# Patient Record
Sex: MALE | URBAN - METROPOLITAN AREA
[De-identification: names, ages, dates, MRNs, and addresses within clinical notes are randomized per-mention and may not be internally consistent; named-entity substitution may affect disease eponyms.]

---

## 2018-01-24 ENCOUNTER — TRANSFERRED RECORDS (OUTPATIENT)
Dept: HEALTH INFORMATION MANAGEMENT | Facility: CLINIC | Age: 43
End: 2018-01-24

## 2019-08-20 ENCOUNTER — OFFICE VISIT (OUTPATIENT)
Dept: FAMILY MEDICINE | Facility: CLINIC | Age: 44
End: 2019-08-20
Payer: COMMERCIAL

## 2019-08-20 VITALS
HEIGHT: 65 IN | RESPIRATION RATE: 16 BRPM | BODY MASS INDEX: 36.32 KG/M2 | SYSTOLIC BLOOD PRESSURE: 134 MMHG | WEIGHT: 218 LBS | DIASTOLIC BLOOD PRESSURE: 82 MMHG | TEMPERATURE: 98 F | HEART RATE: 96 BPM

## 2019-08-20 DIAGNOSIS — F43.23 SITUATIONAL MIXED ANXIETY AND DEPRESSIVE DISORDER: ICD-10-CM

## 2019-08-20 DIAGNOSIS — E66.01 MORBID OBESITY (H): ICD-10-CM

## 2019-08-20 DIAGNOSIS — R79.89 ELEVATED LIVER FUNCTION TESTS: ICD-10-CM

## 2019-08-20 DIAGNOSIS — G47.33 OSA (OBSTRUCTIVE SLEEP APNEA): ICD-10-CM

## 2019-08-20 DIAGNOSIS — Z00.00 ROUTINE GENERAL MEDICAL EXAMINATION AT A HEALTH CARE FACILITY: Primary | ICD-10-CM

## 2019-08-20 DIAGNOSIS — Z13.6 CARDIOVASCULAR SCREENING; LDL GOAL LESS THAN 160: ICD-10-CM

## 2019-08-20 PROCEDURE — 99396 PREV VISIT EST AGE 40-64: CPT | Performed by: FAMILY MEDICINE

## 2019-08-20 PROCEDURE — 36415 COLL VENOUS BLD VENIPUNCTURE: CPT | Performed by: FAMILY MEDICINE

## 2019-08-20 PROCEDURE — 80061 LIPID PANEL: CPT | Performed by: FAMILY MEDICINE

## 2019-08-20 PROCEDURE — 80053 COMPREHEN METABOLIC PANEL: CPT | Performed by: FAMILY MEDICINE

## 2019-08-20 ASSESSMENT — MIFFLIN-ST. JEOR: SCORE: 1796.97

## 2019-08-20 NOTE — PROGRESS NOTES
SUBJECTIVE:   CC: Pallavi Harper is an 43 year old male who presents for preventive health visit.     Healthy Habits:    Do you get at least three servings of calcium containing foods daily (dairy, green leafy vegetables, etc.)? NO    Amount of exercise or daily activities, outside of work: less due to back injury in 2015    Problems taking medications regularly not applicable    Medication side effects: No    Have you had an eye exam in the past two years? yes    Do you see a dentist twice per year? yes    Do you have sleep apnea, excessive snoring or daytime drowsiness?YES - uses cpap, less helpful the past 5 years with weight gain    Patient has history of sleep apnea for which she is using CPAP.  Sometimes he is not able to sleep with CPAP does not work try it.  He also complains of low back pain he has MRIs done in the past and he has mild disc herniation at L4-L5 L5-S1 level.  He has not done any physical therapy but he was advised to see if further evaluation but patient has not had time to do that.  Obesity is also noted in the patient.  He is overall slight anxious and situational anxiety with mild depressed mood.  Is not currently on any medications.    Today's PHQ-2 Score:   PHQ-2 ( 1999 Pf[employer]) 8/20/2019 10/20/2016   Q1: Little interest or pleasure in doing things 0 0   Q2: Feeling down, depressed or hopeless 0 0   PHQ-2 Score 0 0       Abuse: Current or Past(Physical, Sexual or Emotional)- NO  Do you feel safe in your environment? Yes    Social History     Tobacco Use     Smoking status: Former Smoker     Packs/day: 0.25     Types: Cigarettes     Last attempt to quit: 2/3/2009     Years since quitting: 10.5     Smokeless tobacco: Never Used   Substance Use Topics     Alcohol use: No     If you drink alcohol do you typically have >3 drinks per day or >7 drinks per week? No                      Last PSA: No results found for: PSA    Reviewed orders with patient. Reviewed health maintenance and updated  "orders accordingly - Yes  Lab work is in process    Reviewed and updated as needed this visit by clinical staff  Tobacco  Meds  Soc Hx        Reviewed and updated as needed this visit by Provider            ROS:  CONSTITUTIONAL: NEGATIVE for fever, chills, change in weight  INTEGUMENTARY/SKIN: NEGATIVE for worrisome rashes, moles or lesions  EYES: NEGATIVE for vision changes or irritation  ENT: NEGATIVE for ear, mouth and throat problems  RESP: NEGATIVE for significant cough or SOB  CV: NEGATIVE for chest pain, palpitations or peripheral edema  GI: NEGATIVE for nausea, abdominal pain, heartburn, or change in bowel habits   male: negative for dysuria, hematuria, decreased urinary stream, erectile dysfunction, urethral discharge  MUSCULOSKELETAL: NEGATIVE for significant arthralgias or myalgia  NEURO: NEGATIVE for weakness, dizziness or paresthesias  PSYCHIATRIC: NEGATIVE for changes in mood or affect    OBJECTIVE:   BP (!) 150/80   Pulse 96   Temp 98  F (36.7  C) (Tympanic)   Resp 16   Ht 1.645 m (5' 4.76\")   Wt 98.9 kg (218 lb)   BMI 36.54 kg/m    EXAM:  GENERAL: healthy, alert and no distress  NECK: no adenopathy, no asymmetry, masses, or scars and thyroid normal to palpation  RESP: lungs clear to auscultation - no rales, rhonchi or wheezes  CV: regular rate and rhythm, normal S1 S2, no S3 or S4, no murmur, click or rub, no peripheral edema and peripheral pulses strong  ABDOMEN: soft, nontender, no hepatosplenomegaly, no masses and bowel sounds normal  MS: no gross musculoskeletal defects noted, no edema    Diagnostic Test Results:  Labs reviewed in Epic    ASSESSMENT/PLAN:       ICD-10-CM    1. Routine general medical examination at a health care facility Z00.00 Lipid panel reflex to direct LDL Fasting     Comprehensive metabolic panel   2. BOUCHRA (obstructive sleep apnea) G47.33    3. Morbid obesity (H) E66.01    4. CARDIOVASCULAR SCREENING; LDL GOAL LESS THAN 160 Z13.6 Lipid panel reflex to direct LDL " "Fasting     Comprehensive metabolic panel   5. Situational mixed anxiety and depressive disorder F43.23      Labs ordered.  Once done we will follow-up on that.  Discussed with him about sleep apnea he is advised to see a sleep specialist to see if any adjustment needs to be done.  Low back pain discussed further management including physical therapy and some time injections can help.  He will let us know if he needs any referral regarding that.  Healthy diet and regular exercise discussed with the patient.  COUNSELING:  Reviewed preventive health counseling, as reflected in patient instructions       Regular exercise       Healthy diet/nutrition     Estimated body mass index is 36.54 kg/m  as calculated from the following:    Height as of this encounter: 1.645 m (5' 4.76\").    Weight as of this encounter: 98.9 kg (218 lb).         reports that he quit smoking about 10 years ago. His smoking use included cigarettes. He smoked 0.25 packs per day. He has never used smokeless tobacco.      Counseling Resources:  ATP IV Guidelines  Pooled Cohorts Equation Calculator  FRAX Risk Assessment  ICSI Preventive Guidelines  Dietary Guidelines for Americans, 2010  USDA's MyPlate  ASA Prophylaxis  Lung CA Screening    Kevan Kay MD  [facility] G[facility] PRAJEFFERY  "

## 2019-08-20 NOTE — LETTER
August 21, 2019      [address]     [place] [address]        Dear MindyMeredith,    We are writing to inform you of your test results.      Cholesterol level indicates high triglycerides along with very high LDL, this combination can increase the risk of heart disease.  Obesity, lack of exercise and eating habits can contribute to those numbers.  I will suggest you start working on your diet and do regular exercise.  We can either start you on a low-dose of a cholesterol-lowering medication now or if you want to work on your diet and exercise and recheck in 6 months, we can compare and see if we need to start your medication at that time.     Kidney functions are normal.  Glucose is also normal no diabetes.     ALT is slightly elevated this could be due to obesity has some time small amount of fatty tissue on the liver can contribute that as well.  I will suggest rechecking that happen again in 6 weeks.  If this continue to be elevated we may need to do further evaluation with ultrasound.  Labs ordered.  Please call our clinic at [phone] and make that appointment for lab only to check for liver function in 6 weeks.     Resulted Orders   Lipid panel reflex to direct LDL Fasting   Result Value Ref Range    Cholesterol 268 (H) <200 mg/dL      Comment:      Desirable:       <200 mg/dl    Triglycerides 269 (H) <150 mg/dL      Comment:      Borderline high:  150-199 mg/dl  High:             200-499 mg/dl  Very high:       >499 mg/dl  Fasting specimen      HDL Cholesterol 40 >39 mg/dL    LDL Cholesterol Calculated 174 (H) <100 mg/dL      Comment:      Above desirable:  100-129 mg/dl  Borderline High:  130-159 mg/dL  High:             160-189 mg/dL  Very high:       >189 mg/dl      Non HDL Cholesterol 228 (H) <130 mg/dL      Comment:      Above Desirable:  130-159 mg/dl  Borderline high:  160-189 mg/dl  High:             190-219 mg/dl  Very high:       >219 mg/dl     Comprehensive metabolic panel   Result Value  Ref Range    Sodium 140 133 - 144 mmol/L    Potassium 3.8 3.4 - 5.3 mmol/L    Chloride 105 94 - 109 mmol/L    Carbon Dioxide 25 20 - 32 mmol/L    Anion Gap 10 3 - 14 mmol/L    Glucose 94 70 - 99 mg/dL      Comment:      Fasting specimen    Urea Nitrogen 10 7 - 30 mg/dL    Creatinine 0.86 0.66 - 1.25 mg/dL    GFR Estimate >90 >60 mL/min/[1.73_m2]      Comment:      Non  GFR Calc  Starting 12/18/2018, serum creatinine based estimated GFR (eGFR) will be   calculated using the Chronic Kidney Disease Epidemiology Collaboration   (CKD-EPI) equation.      GFR Estimate If Black >90 >60 mL/min/[1.73_m2]      Comment:       GFR Calc  Starting 12/18/2018, serum creatinine based estimated GFR (eGFR) will be   calculated using the Chronic Kidney Disease Epidemiology Collaboration   (CKD-EPI) equation.      Calcium 8.8 8.5 - 10.1 mg/dL    Bilirubin Total 0.6 0.2 - 1.3 mg/dL    Albumin 4.4 3.4 - 5.0 g/dL    Protein Total 8.5 6.8 - 8.8 g/dL    Alkaline Phosphatase 78 40 - 150 U/L     (H) 0 - 70 U/L    AST 39 0 - 45 U/L       If you have any questions or concerns, please call the clinic at the number listed above.       Sincerely,        Kevan Kay MD

## 2019-08-21 LAB
ALBUMIN SERPL-MCNC: 4.4 G/DL (ref 3.4–5)
ALP SERPL-CCNC: 78 U/L (ref 40–150)
ALT SERPL W P-5'-P-CCNC: 112 U/L (ref 0–70)
ANION GAP SERPL CALCULATED.3IONS-SCNC: 10 MMOL/L (ref 3–14)
AST SERPL W P-5'-P-CCNC: 39 U/L (ref 0–45)
BILIRUB SERPL-MCNC: 0.6 MG/DL (ref 0.2–1.3)
BUN SERPL-MCNC: 10 MG/DL (ref 7–30)
CALCIUM SERPL-MCNC: 8.8 MG/DL (ref 8.5–10.1)
CHLORIDE SERPL-SCNC: 105 MMOL/L (ref 94–109)
CHOLEST SERPL-MCNC: 268 MG/DL
CO2 SERPL-SCNC: 25 MMOL/L (ref 20–32)
CREAT SERPL-MCNC: 0.86 MG/DL (ref 0.66–1.25)
GFR SERPL CREATININE-BSD FRML MDRD: >90 ML/MIN/{1.73_M2}
GLUCOSE SERPL-MCNC: 94 MG/DL (ref 70–99)
HDLC SERPL-MCNC: 40 MG/DL
LDLC SERPL CALC-MCNC: 174 MG/DL
NONHDLC SERPL-MCNC: 228 MG/DL
POTASSIUM SERPL-SCNC: 3.8 MMOL/L (ref 3.4–5.3)
PROT SERPL-MCNC: 8.5 G/DL (ref 6.8–8.8)
SODIUM SERPL-SCNC: 140 MMOL/L (ref 133–144)
TRIGL SERPL-MCNC: 269 MG/DL

## 2019-11-08 ENCOUNTER — HEALTH MAINTENANCE LETTER (OUTPATIENT)
Age: 44
End: 2019-11-08

## 2020-05-04 ENCOUNTER — TRANSFERRED RECORDS (OUTPATIENT)
Dept: HEALTH INFORMATION MANAGEMENT | Facility: CLINIC | Age: 45
End: 2020-05-04

## 2020-05-24 ENCOUNTER — TELEPHONE (OUTPATIENT)
Dept: SLEEP MEDICINE | Facility: CLINIC | Age: 45
End: 2020-05-24

## 2020-05-24 NOTE — TELEPHONE ENCOUNTER
A request to schedule an appointment has been received for Dr. James Arias.   The patient has been notified that a member of the [facility] will contact the patient to schedule the appointment.

## 2020-06-26 ENCOUNTER — VIRTUAL VISIT (OUTPATIENT)
Dept: SLEEP MEDICINE | Facility: CLINIC | Age: 45
End: 2020-06-26
Payer: COMMERCIAL

## 2020-06-26 VITALS — WEIGHT: 203 LBS | HEIGHT: 66 IN | BODY MASS INDEX: 32.62 KG/M2

## 2020-06-26 DIAGNOSIS — G47.33 OSA (OBSTRUCTIVE SLEEP APNEA): ICD-10-CM

## 2020-06-26 DIAGNOSIS — F51.04 CHRONIC INSOMNIA: Primary | ICD-10-CM

## 2020-06-26 PROCEDURE — 90791 PSYCH DIAGNOSTIC EVALUATION: CPT | Mod: GT | Performed by: PSYCHOLOGIST

## 2020-06-26 ASSESSMENT — ANXIETY QUESTIONNAIRES
6. BECOMING EASILY ANNOYED OR IRRITABLE: NEARLY EVERY DAY
7. FEELING AFRAID AS IF SOMETHING AWFUL MIGHT HAPPEN: NEARLY EVERY DAY
IF YOU CHECKED OFF ANY PROBLEMS ON THIS QUESTIONNAIRE, HOW DIFFICULT HAVE THESE PROBLEMS MADE IT FOR YOU TO DO YOUR WORK, TAKE CARE OF THINGS AT HOME, OR GET ALONG WITH OTHER PEOPLE: VERY DIFFICULT
1. FEELING NERVOUS, ANXIOUS, OR ON EDGE: NEARLY EVERY DAY
5. BEING SO RESTLESS THAT IT IS HARD TO SIT STILL: SEVERAL DAYS
GAD7 TOTAL SCORE: 17
2. NOT BEING ABLE TO STOP OR CONTROL WORRYING: NEARLY EVERY DAY
3. WORRYING TOO MUCH ABOUT DIFFERENT THINGS: NEARLY EVERY DAY

## 2020-06-26 ASSESSMENT — PATIENT HEALTH QUESTIONNAIRE - PHQ9
SUM OF ALL RESPONSES TO PHQ QUESTIONS 1-9: 20
5. POOR APPETITE OR OVEREATING: SEVERAL DAYS

## 2020-06-26 ASSESSMENT — MIFFLIN-ST. JEOR: SCORE: 1743.55

## 2020-06-26 NOTE — PROGRESS NOTES
"Pallavi Harper is a 44 year old adult who is being evaluated via a billable video visit.      The patient has been notified of following:     \"This video visit will be conducted via a call between you and your physician/provider. We have found that certain health care needs can be provided without the need for an in-person physical exam.  This service lets us provide the care you need with a video conversation.  If a prescription is necessary we can send it directly to your pharmacy.  If lab work is needed we can place an order for that and you can then stop by our lab to have the test done at a later time.    Video visits are billed at different rates depending on your insurance coverage.  Please reach out to your insurance provider with any questions.    If during the course of the call the physician/provider feels a video visit is not appropriate, you will not be charged for this service.\"    Patient has given verbal consent for Video visit? Yes    Will anyone else be joining your video visit? No      Video-Visit Details    Type of service:  Video Visit    Video Start Time: 8:39 AM  Video End Time: 9:15 AM, approximate    Originating Location (pt. Location): Home    Distant Location (provider location):  [facility]     Platform used for Video Visit: Rupal Arias PsyD    SLEEP MEDICINE CONSULTATION  Sleep Psychology    Name: Pallavi Harper MRN# [id number]   Age: 44 year old YOB: 1975     Date of Consultation: Jun 26, 2020  Consultation is requested by: No referring provider defined for this encounter.  Primary care provider: Chris Way    Reason for Sleep Consultation     Pallavi Harper is a 44 year old adult seen today for a behavioral sleep medicine consultation because of insomnia.      Assessment and Plan     Sleep Diagnoses/Recommendations:    1. Chronic insomnia  Insomnia presentation consistent with psychophysiologic insomnia emerging " about 2 years ago with precipitating events stress related.  Patient is an excellent candidate for cognitive behavioral therapy for insomnia with targets and inadequate stimulus control, sleep specific worry, active mind, and inadequate sleep hygiene.  Patient was prescribed a 6.5-hour sleep window based on sleep diary data.  Sleep schedule between 9:30 PM-4 AM.  Patient will avoid napping as he currently does.  He will reduce caffeine use to no later than noon.  He will get out of bed if he cannot sleep and do some other relaxing activities he does before bed to lying down.  He will return to bed when he feels sleepy.  He will avoid researching and focusing on insomnia on the Internet.  Follow-up in 2 weeks    2. BOUCHRA (obstructive sleep apnea)  Continue using CPAP nightly, follow-up with [facility] for care as needed      See patient instructions for initial treatment recommendations and behavioral sleep plan.    Summary Counseling:      Pallavi was provided information about the pathophysiology of insomnia and psychophysiological factors contributing to the onset and maintenance of insomnia associated with psychophysiologic features, co-occurring obstructive sleep apnea.  Treatment option were discussed including component of cognitive-behavioral therapy for insomnia. The benefits and potential early side effects of treatment including increased daytime sleepiness were discussed. She was advised to seek medical advise and consultation around use of or changes to prescription sleep medication, Patient was counseled on the importance of avoiding driving if drowsy.        Follow-up: 2 weeks     History of Present Sleep Complaint     Pallavi Harper is a 44 year old year old adult who reports he has snored since childhood.  He is a loud snorer.  About 5 years ago wife witness sleep apnea and a sleep test was completed about 5 years agowith the [facility] and was initiated on CPAP.  Felt he  was helped a great deal by use of CPAP.    Reports significant weight gain over last few years.  Lately he notices he awakens with headache, and increased tiredness.  He went back to [facility] and had adjustment made to CPAP setting.    Over last couple years there has been onset of increase sleep fragmentation and insomnia.  More difficulty falling back to sleep, increase worry and rumination in the middle of the night.    Download data per patient report is effective.    Reports very active mind, worry about work and next day activities.  In 2016 switched job in company with new boss and team.  Increased performance expectations.    Began experiencing early morning waking and has consistent waking at 4 AM. Increased worry about health, developed and arrhythmia.      Patient is seeing a therapist to deal with stress.  Also working with cardiologist to address a possible cardia issues.    Work in real estate, accounting and finance.  Describes job as very demanding. Est works 8-9 hours a day or 50 hours a week.    , has bed partner. Just sold their house. Now in close quarters in small apartment.    Caffeine use - 1-2 cups of coffee.  Drink tea during the day.  Latest after dinner.    Alcohol use - no    No cannabis, has tried CBD oil    No nicotine    Bedroom is dark, comfortable and quiet.    Took melatonin for a while but no longer as it didn't work.  Used diphenhydramine was not effective.    Bedtime is between 9-10 PM.  Sleep latency within 15 minutes.  Applies CPAP nightly. Up for the day by 4-430 AM.Wake us 3-6 times per night.  Takes about 5-30 minutes to fall back to sleep.  No naps.    In the middle of the night frustrated not going back to sleep.  Concerned about effects of poor sleep on work    No history of shiftwork    Denies parasomnias, nightmares, sudden sleep attacks, sleep paralysis, denies restless legs symptoms.    Previous Sleep Studies:    Sleep study completed [facility] in  "2015, no data available.  Patient reports continuous use of CPAP with good results.         Screening          [place] Sleepiness Scale  Total score - [place]: 7 .            PHQ-9 SCORE 6/26/2020   PHQ-9 Total Score 20       MELANIA-7 SCORE 6/26/2020   Total Score 17       Patient Activation Score     MAURI Score (Last Two) 11/19/2012   MAURI Raw Score 38   Activation Score 52.9   MAURI Level 2         Vitals     Ht 1.676 m (5' 6\")   Wt 92.1 kg (203 lb)   BMI 32.77 kg/m       Medical History     Patient Active Problem List   Diagnosis     Obesity     Dyspnea and respiratory abnormality     CARDIOVASCULAR SCREENING; LDL GOAL LESS THAN 160     Hyperlipidemia LDL goal <160     BOUCHRA (obstructive sleep apnea)     Obesity (BMI 35.0-39.9) with comorbidity (H)        Current Outpatient Medications   Medication Sig Dispense Refill     Multiple Vitamin (MULTI-VITAMIN) per tablet Take 1 tablet by mouth daily. 90 tablet 3       Past Surgical History:   Procedure Laterality Date     NO HISTORY OF SURGERY            Allergies   Allergen Reactions     Crabs [Crustaceans]          Psychiatric History     Prior Psychiatric Diagnoses: none   Psychiatric Hospitalizations: none   Use of Psychotropics none      Chemical Use     Prior Chemical Dependency Treatment: none         Family History     Family History   Problem Relation Age of Onset     Hypertension Mother      Cerebrovascular Disease Mother         in her early 50's     Lipids Mother      Gastrointestinal Disease Father         Acid Reflux     Heart Disease Maternal Grandmother         hear attack     Asthma Maternal Grandfather      Arthritis Paternal Grandmother      Osteoporosis Paternal Grandmother      Cancer Paternal Grandfather         Lung cancer - smoker     Asthma Sister      Genitourinary Problems Sister         Kidney problem - because of too many drugs        Sleep Disorders: None reported    Social History     Social History     Socioeconomic History     Marital status: "      Spouse name: None     Number of children: None     Years of education: None     Highest education level: None   Occupational History     None   Social Needs     Financial resource strain: None     Food insecurity     Worry: None     Inability: None     Transportation needs     Medical: None     Non-medical: None   Tobacco Use     Smoking status: Former Smoker     Packs/day: 0.25     Types: Cigarettes     Last attempt to quit: 2/3/2009     Years since quittin.4     Smokeless tobacco: Never Used   Substance and Sexual Activity     Alcohol use: No     Drug use: No     Sexual activity: Yes     Partners: Female     Birth control/protection: Condom   Lifestyle     Physical activity     Days per week: None     Minutes per session: None     Stress: None   Relationships     Social connections     Talks on phone: None     Gets together: None     Attends Alevism service: None     Active member of club or organization: None     Attends meetings of clubs or organizations: None     Relationship status: None     Intimate partner violence     Fear of current or ex partner: None     Emotionally abused: None     Physically abused: None     Forced sexual activity: None   Other Topics Concern     Parent/sibling w/ CABG, MI or angioplasty before 65F 55M? Not Asked   Social History Narrative     None       Works full-time in real estate, finance, leads a team, moderate work stress     Mental Status Examination     Achmad presented as appropriately dressed and groomed and was oriented X3 with speech language intact.  The patient was cooperative throughout the evaluation with no signs of hallucinations, delusions, loosening of associations or other thought disturbance.  Mood was normal.  Affect was congruent with mood. Insight and judgement we intact.  Memory was intact for recent and remote elements.  There was no report of suicidal ideation, intention or plan. Attention and concentration were within  normal.        Copy:   Chris Way               No referring provider defined for this encounter.    James Arias PsyD, LP, DBSM  Diplomate, Behavioral Sleep Medicine  [facility]  [facility] [place]

## 2020-06-26 NOTE — PATIENT INSTRUCTIONS
Pallavi,  It was great having the opportunity to consult with you about your sleep concerns this morning.  Below you will find an introduction to how insomnia develops as well as a second module discussing your plan of action.  In addition to the steps outlined below, I think you may benefit from reducing caffeine use to no later than noon.  Please read review and follow the instructions in your intervention module called sleep strengthening.  Your follow-up is scheduled for July 10 at 8:30 AM.  There are instructions about using the CBT-I  application to continue to monitor your sleep during the next 2 weeks.  We will review these data at her follow-up video visit.    James Arias PsyD        Welcome to Cognitive Behavioral Therapy for Insomnia (CBT-I)    Most people have trouble sleeping at some point in their life.   Chronic insomnia means you have had trouble falling asleep and/or staying asleep for at least the past three months.  Despite allowing enough time for sleep, it is affecting how you feel. You are not alone.  It is estimated that 10-15% of adults experience chronic insomnia.     How CBT-I Works    Cognitive Behavioral Therapy for Insomnia (CBT-I) targets behavioral factors that lead to long-term insomnia:    ? Conditioning    When you lay awake in bed over many nights, your body actually becomes trained or 'conditioned' to be awake during the night.  It makes the bed associated with alertness instead of sleepiness.    ? Habits that weaken your body's sleep drive and sleep clock    Changing sleep schedules, extended time in bed, using mobile devices and computers close to bedtime, and naps too late in the day can harm your sleep at night.    ? Emotional and Physical States    Things such as worrying about sleep, stress, drinking too much caffeine, and not winding down before bed can interfere with your body's natural sleep drive.    Understanding Sleep and Insomnia    [facility] CBT-I  is a four-part program that teaches you the skills needed for a better night's sleep. The first step in your program is learning a bit about sleep and insomnia.  This introduction discusses some of the science behind the habits you will change during your program. You will also begin taking the first steps towards changing how you sleep.    The Basics of Sleep    How does sleep help us?    Sleep like food and water is something we need every day. The purpose of sleep is still not exactly clear, but sleep experts agree we need consistent quality sleep to function at our best. There is evidence that sleep helps maintain brain and body functions.  IT helps maintain thinking ability and mood.  Sleep is actually a very active part of life. Sleep occurs in four stages that cycle every  minutes throughout the night. We get our deepest sleep during the first few hours of sleep.  During the last half of our sleep, we usually get the bulk of our REM (Rapid Eye Movement) sleep.  REM sleep is when most of our dreaming occurs.    How much sleep do we need?    Sleep needs vary from person to person. Most adults need between 6-8 hours of sleep.  Sleeping too little or too much may be a health risk.  As we age, most people report their sleep gets lighter, earlier, shorter, and more restless.     What Controls Our Sleep?    The three things that regulate your sleep are your sleep drive, biological clock and emotional/physical states.  Together these make us feel alert during the day and promote sleep at night.    Your sleep drive depends on how long you have been awake. It is lowest when you first wake up.  Sleep drive gradually increases as the day goes on.  The longer time you are awake the easier it is to fall asleep.  Sleeping gradually reduces your sleep drive. That is why napping in the evening or close to bed can make it harder to sleep at night.    Your biological clock promotes wakefulness during the day and sleep at  night.        Figure 1 two process sleep model (source: 1. Bertha ROWE, Prince R, Roger T, et al. Future research directions in sleep and ADHD: report of a consensus working group.   J Atten Disord. 2013;17(7):550-564. doi:10.1177/0693940794636452)    Emotional and Physical States    Mental activity, emotions and physical symptoms can make your brain too active to sleep by masking the strength of your sleep drive. Your brain's arousal system not only triggers insomnia, it plays a role in maintaining it as well.  Common sources of arousal include:    ? Worry about sleep in bed  ? An active mind concerned about unfinished tasks  ? Anxiety, Stress and Depression  ? Pain     Understanding Insomnia    What causes insomnia?  Some people have a greater likelihood of developing insomnia due to genetics, personality or age. A host of things can trigger it: jet lag, working a different shift, medication, or the onset of a medical or mental health condition.    What maintains insomnia?    Short-term insomnia can become chronic when you begin to feel fearful, worried and  on guard  about sleep loss. As you spend more time in bed or try forcing yourself to sleep your bed becomes linked with wakefulness.  This is why people with insomnia commonly report feeling tired before bed but suddenly more alert when getting into bed.  This type of  conditioning  maintains the insomnia even when the triggering event or condition is resolved.    Tracking your Sleep    Sleep tracking is an essential part of training yourself to sleep better and monitoring your progress.  There are several ways to keep track of your sleep habits.     [employer] Sleep Diary    The [facility] sleep diary is easy to use and includes an example of how to complete it every morning after you get up for the day. Do not watch the clock at night.  Your daily diary entry is your recollection of the past 24 hours.    Sleep Diary Apps    CBT-i  and other free sleep  diary apps are a convenient way to track your sleep on your mobile phone. If using CBT-i , simply go to My Sleep >Sleep Diary > Add New Entry and record your entry for the day. The [employer] graphs your information and has helpful reminders.      Mobile and Wearable Sleep Tracking Devices    There are many sleep tracking devices and mobile applications that estimate the timing and quantity of sleep by measuring body movement.  Though many of these devices claim to measure the depth or stages of sleep, they cannot measure brain wave activity or other measures required to determine the actual stages of sleep.  They are helpful in estimating the timing and total amount of time you sleep.    CBT-I and Sleeping Pills    Sleep medications can be helpful in the short-term but often stop working in the long-term.  Sleeping pills treat symptoms and not the underlying cause of insomnia.  They also can have side effects that last well into the day. Abruptly stopping sleeping pills can cause temporary rebound insomnia and lead to increased distress about sleeplessness.  This in turn strengthens the belief that pills are necessary for sleep.    Many patients choose to discontinue sleeping pills prior to beginning CBT-I.  You should talk to your prescribing provider before tapering or discontinuing sleep medication.    Changes You Can Make TODAY    As you begin to track your sleep, we recommend you get started today with the following changes that set the stage for a better night's sleep.    Reduce your consumption of caffeine and alcohol.  Both can disrupt sleep and make strengthening your sleep more difficult.      ? No caffeine within 6 hours of bedtime   ? No more than 3 caffeinated tin per day (regular 8 oz. cups)  ? No alcohol within 3 hours of bedtime    Make sure your bedroom is quiet, comfortable and dark.  Noise, light and an uncomfortable sleep space can harm your sleep.      ? Light in the bedroom can affect the depth  and even the timing of your sleep.   ?  Remove anything from your sleep space unrelated to sleep such as a computer or TV.    ? Make sure the temperature in your sleep space is comfortable - neither too warm nor cold.        Cognitive Behavioral Therapy for Insomnia (CBT-I)    Sleep Strengthening    Now that you understand a bit more about how sleep works and what causes insomnia, you are ready to move on to the core of CBT-I:  Sleep Strengthening.  This process involves five key strategies that will:    ? Strengthen your sleep drive and biological sleep clock  ? Strengthen the link between your bed and sleep    Core Sleep Strengthening Strategies     You are now ready to start the process of strengthening your sleep. Much like physical therapy strengthens muscles, studies show these five sleep strategies are the key to achieving stronger, healthier sleep.     1. Reduce your  Time In Bed    Spending extra time in bed trying to get more sleep actually can cause more sleep disruption and weaken sleep efficiency. Sleep efficiency is simply the percentage of time a person spends asleep while in bed. Normal sleep efficiency is thought to be 85% or greater.  By reducing your time in bed, you will be awake longer leading to quicker and deeper sleep. This strategy does not reduce the amount of sleep you get, just the time you are awake in bed:                                       Your Sleep Schedule Prescription    During the first step of sleep strengthening, you will reduce your time in bed following a Sleep Schedule Prescription. Your prescription is determined by the average nightly amount of sleep you got over the past two weeks plus 30 minutes. It also takes into account the best time for you to sleep. The least amount of time prescribed is 6 hours in bed unless a sleep specialist recommends otherwise.     Total Time in Bed:  6.5 hours  Bedtime:  No earlier than  9:30 PM  Wake-up Time:  Out of bed every day by  4 am  with alarm.     2. Don't go to bed until you feel sleepy (not tired or fatigued)     This helps increase your sleep drive by keeping you awake longer.  If you go to bed when you're not sleepy, it gives your brain the wrong message and can lead to frustration.     If you feel sleepy before your prescribed bedtime, find activities that can help you stay awake until it is time for you to go to bed. Even brief naps in the evening can be very disruptive of sleep at night.     3. Don't stay in bed unless you are sleeping      If you are unable to fall asleep or return to sleep after about 30 minutes, get out of bed. This helps train your brain that the bed is for sleep. It is harmful to your sleep when you are worried or frustrated in bed. Do not read, eat, worry, think about sleep, use mobile phones or tablets, or watch TV in bed. Do not watch the clock during the night.     Go to another room and do something relaxing. Plan things you can do when you get out of bed. Avoid use of mobile devices or computers when out of bed.    Return to bed only when you feel sleepy again.  Repeat as often as needed throughout the night.     4. Get out of bed at the same time every day    Getting up at the same time helps set your biological clock. It is important to stick to your wake time no matter how much sleep you got the night before or how you feel in the morning.    Varying your wake can have the same effect as jet lag.  It disrupts your biological clock and makes you feel more tired and exhausted.  Trying to  catch up  on sleep on the weekends only makes things worse and sets you up for a cycle of poor sleep the following weekdays.      Make sure you set an alarm and keep it away from the bed to prevent looking at the clock during the night.      5. Avoid daytime napping    Avoid daytime napping if possible. Napping partially fulfills your need for sleep and weakens your sleep drive at night.    However, if you find yourself  sleepy (not just tired) you can take a brief 15-30 minute nap during the day if needed.  Naps within 7-8 hours of your prescribed wake time are less likely to affect your sleep the coming night.  Sleepy people make more mistakes and may hurt themselves or others. Therefore, safety trumps all other sleep prescriptions.  Never drive or operate equipment if drowsy or sleepy.     Changing Your Sleep Schedule Prescription    After one week, you can adjust your sleep schedule prescription based on how well you are sleeping. Use the following guidelines to change your prescribed time in bed based on information from your Sleep Diary, Mobile Sleep Diary Corina or Wearable Device:          Increase Time in Bed by 15 Minutes IF:      You are falling asleep in less than 30 minutes AND awake less than 30 minutes during the night.       OR your CBT-I  pp, Mobile Corina or Wearable Device indicates your sleep efficiency has been greater than 90%.              Decrease Time in Bed by 15 Minutes IF:      It is taking longer than 30 minutes to fall asleep OR you are awake more than 30 minutes during the night.        OR your CBT-I  Corina, Mobile Corina or Wearable Device indicates your sleep efficiency has been less than 80%.      DO NOT decrease your sleep schedule below 6 hours unless instructed by your provider.    Change is Challenging     Research shows that making significant changes in sleep habits improves sleep quality and how you feel. Improvement takes time and is not always steady. Change is challenging and can be stressful.  This is especially true if old habits - like spending more time in bed -- were a way to avoid worry about getting enough sleep.

## 2020-06-27 ASSESSMENT — ANXIETY QUESTIONNAIRES: GAD7 TOTAL SCORE: 17

## 2020-07-09 VITALS — WEIGHT: 201 LBS | HEIGHT: 66 IN | BODY MASS INDEX: 32.3 KG/M2

## 2020-07-09 ASSESSMENT — ANXIETY QUESTIONNAIRES
3. WORRYING TOO MUCH ABOUT DIFFERENT THINGS: NEARLY EVERY DAY
GAD7 TOTAL SCORE: 8
5. BEING SO RESTLESS THAT IT IS HARD TO SIT STILL: SEVERAL DAYS
IF YOU CHECKED OFF ANY PROBLEMS ON THIS QUESTIONNAIRE, HOW DIFFICULT HAVE THESE PROBLEMS MADE IT FOR YOU TO DO YOUR WORK, TAKE CARE OF THINGS AT HOME, OR GET ALONG WITH OTHER PEOPLE: SOMEWHAT DIFFICULT
6. BECOMING EASILY ANNOYED OR IRRITABLE: SEVERAL DAYS
1. FEELING NERVOUS, ANXIOUS, OR ON EDGE: SEVERAL DAYS
2. NOT BEING ABLE TO STOP OR CONTROL WORRYING: SEVERAL DAYS
7. FEELING AFRAID AS IF SOMETHING AWFUL MIGHT HAPPEN: NOT AT ALL

## 2020-07-09 ASSESSMENT — MIFFLIN-ST. JEOR: SCORE: 1734.48

## 2020-07-09 ASSESSMENT — PATIENT HEALTH QUESTIONNAIRE - PHQ9
SUM OF ALL RESPONSES TO PHQ QUESTIONS 1-9: 8
5. POOR APPETITE OR OVEREATING: SEVERAL DAYS

## 2020-07-09 NOTE — PROGRESS NOTES
"Pallavi Harper is a 44 year old adult who is being evaluated via a billable video visit.      The patient has been notified of following:     \"This video visit will be conducted via a call between you and your physician/provider. We have found that certain health care needs can be provided without the need for an in-person physical exam.  This service lets us provide the care you need with a video conversation.  If a prescription is necessary we can send it directly to your pharmacy.  If lab work is needed we can place an order for that and you can then stop by our lab to have the test done at a later time.    Video visits are billed at different rates depending on your insurance coverage.  Please reach out to your insurance provider with any questions.    If during the course of the call the physician/provider feels a video visit is not appropriate, you will not be charged for this service.\"    Patient has given verbal consent for Video visit? Yes  How would you like to obtain your AVS? Vincenthart  Patient would like the video invitation sent by: Send to e-mail at: [email or web address]  Will anyone else be joining your video visit? No      Video-Visit Details    Type of service:  Video Visit    Video Start Time: 8:29 AM  Video End Time: 9:02 AM    Originating Location (pt. Location): Home    Distant Location (provider location):  [facility]     Platform used for Video Visit: Rupal Arias Rodolfo      SLEEP MEDICINE VIRTUAL VIDEO VISIT  Sleep Psychology    Patient Name: Pallavi Harper  MRN:  [id number]  Date of Service: Jul 10, 2020       Subjective Report     Pallavi Harper  returns for a telehealth video visit to discuss progress in implementing behavioral strategies for the management of insomnia.  Patient consent for initiation of video visit was obtained and documented prior to initiation of visit.     Pallavi reports he is sleeping overall.  Focus of session on addressing " "negative sleep thoughts and maladaptive expectations about sleep.  .     .     Sleep Data:     Source of Data: CBT-I     Sleep Latency: Within 15 min.  Times Awakened: 1  Wake Time After Sleep Onset: 35 min.  Total Sleep Time: 6 hours 15 min.  Sleep Efficiency: Greater than 85 %    Total score - [place]: 7 .    CELESTINO Total Score: 14       Interventions     Strategies and recommendations including stimulus control, cognitive restructuring, stress management were discussed today.       Vital Signs     Ht 1.676 m (5' 6\")   Wt 91.2 kg (201 lb)   BMI 32.44 kg/m       Mental Status     Speech/Language:  Normal  Thought Process: Intact  Associations:  Normal  Thought Content: Normal    Diagnostic Impressions and Plan       1. Chronic insomnia  Patient is reporting a significant provement in sleep efficiency, sleep consolidation and reduction wake after sleep onset.  Patient will focus in the next 3 weeks on addressing and reducing negative sleep thoughts, and addressing sleep specific worry and anxiety around health.  He will follow-up with his psychotherapist to address this.  We also discussed health behavior changes including increased activity and returning to the dietary changes that promote better health given his concerns about increasing weight.  He will follow-up with his physician about his concern about possibly being \"prediabetic\".    2. BOUCHRA (obstructive sleep apnea)  Continue to use CPAP as prescribed        Follow-up: 2 weeks      James Arias PsyD, VIRY, DBSM  Diplomate, Behavioral Sleep Medicine  [facility] [place] [facility] [place]                      "

## 2020-07-09 NOTE — PATIENT INSTRUCTIONS
Your followup appointment is scheduled for July 21 at 1 PM.  Continue with your current sleep schedule and recording in the morning using CBTI .    Below are modules for you to review addressing worry about sleep, relaxation and worry management before bed, and stress during COVID 19.  Take care !    James Arias PsyD, [facility],  Director, Insomnia ProgramN[facility]          Cognitive Behavioral Therapy for Insomnia (CBT-I)    Developing Healthy Sleep Thoughts    Insomnia is often is triggered by stressful events such as a change in employment, a separation, medical illness, or loss.  How you handle your sleep problem, mainly your thoughts and behaviors, determines in large part whether your sleeplessness is short -term or develops into chronic insomnia well after the stressful event is over.     This step of your program involves learning a set of skills to change negative and worrisome thoughts about sleep.   Insomnia is more likely to persist if you interpret the onset as a threat or loss of control.  Worry, fears and untrue beliefs about insomnia can become a vicious cycle.  Negative sleep thoughts activate the physical and emotional systems of your body.  This strengthens wakefulness and weakens your sleep system.  This in turn produces increased stress and pressure to sleep.  We call this unhelpful sleep effort.     Changing How You Think About Insomnia    We now know that our thoughts and attitudes affect our stress response.  Negative sleep thoughts can worsen your insomnia. They can lead to greater fear or anxiety about sleep, which in turn can aggravate your sleep problem. Thinking more positively and realistically about your sleep promotes its health and healing.     Myths about Sleep    ? People need 8 hours of sleep     This is untrue.  Sleep needs vary from person to person.  Most people need between 6-8 hours to feel alert during the day.  People who sleep 7 hours live longer than  those who sleep 8 hours.  Research also suggests people who sleep 5 hours live longer than those who sleep 9 hours    ? Insomnia is the same as sleep deprivation    Sleep deprivation is lack of sleep due to not allowing enough time for sleep.  This is typically not true for insomnia where people have enough time for sleep and even extend their sleep time trying to get more sleep.  Most people with insomnia get about the same amount of sleep as normal sleepers.  The difference is that with insomnia the sleep is fragmented and less consolidated.    You are Getting More Sleep than You Think    Research using objective sleep tests reveal that people with insomnia get an average of one hour more sleep than they think. This is due in part because the brain misperceives Stage 1 and 2 sleep as being awake.  In addition, stress and arousal while awake in bed changes the brain's perception of time awake.    Examples of Unhealthy Sleep Thoughts    Negative sleep thoughts can have a profound impact on your ability to get a good night's sleep. Below are some examples of negative thoughts associated with insomnia that may sound familiar to you:    ? I must get 8 hours of sleep to function during the day.  ? I won't get to sleep tonight.  ? Insomnia is going to cause health problems.  ? I am dreading going to bed.  ? I woke up early again.  I know I won't get back to sleep.  ? The reason I feel terrible today is because of my insomnia.  ? I've totally lost control of my sleep.  ? I can't sleep without a sleeping pill.    Negative thoughts usually occur automatically and feel like a knee-jerk reaction.  They are often untrue or distorted, especially late in the evening as you become increasingly tired and others are asleep.      Changing Unhealthy Sleep Thoughts    Now that you understand the impact that negative thoughts can have on your sleep, you are ready to change these unhelpful thoughts.  The strategy is powerful and simple:   By recognizing and replacing your negative thoughts about sleep with more accurate, positive thoughts, you will be less anxious and frustrated about your sleep. A more realistic and positive attitude about sleep will allow you to relax and sleep more easily through the night.           There are several important steps involved in changing your unhelpful and negative thoughts about sleep:            Examples of Healthy Sleep Thoughts    ? My work will not suffer much if I have a poor night's sleep.    ? I'm probably getting more sleep than I think.    ? Other things than my sleep affect my daytime functioning.    ? Because I didn't sleep well last night, I am more likely to sleep well tonight because of increased sleep drive that leads to deeper sleep.    ? Sleep requirements vary from one person to another.    ? If I don't sleep well, most of the time the worst that can happen is that my mood might not be as bright the next day.    ? If I awaken after about 5 hours of sleep, I have gotten the core sleep I need for the day.    ? I'm more likely to fall asleep the longer I've been awake    ? I'm more likely to fall asleep as my body temperature begins to decrease through the night.    ? My body's wakefulness system takes charge during the day to promote daytime functioning.    ? These sleep skills have worked for others, and they can work for me.    Other Recommendations for Changing Beliefs and Attitudes   About Your Sleep    ? Keep Realistic Expectations     There is a widespread belief that 8 hours of sleep is necessary to feel refreshed and function well during the day. Many believe that good sleep means never waking up at night.  Others come to expect that they should always wake up in the morning feeling full of energy.  Concerns may arise when your actual sleep falls short of these expectations. Try to avoid placing undue pressure on yourself to achieve certain sleep levels.  It only increases anxiety about  sleeping.  Focus on quality sleep not quantity of sleep.    ? Revise Your Thoughts about the Causes of Insomnia      There is a natural tendency to attribute our sleep problems completely to external factors such as a chemical imbalance, pain, aging or things over which we may have little control.  Although these factors may contribute to your insomnia, research shows CBT-I is beneficial even if they are present.    ? Don't Blame Insomnia for All Daytime Impairments      Many individuals blame insomnia for every symptom or concern they experience during the day from fatigue to lack of concentration. Though poor sleep may produce some of these symptoms, it us usually untrue that all daytime impairment is attributable to insomnia.  It is more often that other factors such as stress and co-occurring medical problems contribute more to how you feel during the day.      ? Don't Catastrophize      Catastrophic thinking means making a sleep mountain out of a molehill.  Some people believe poor sleep will have catastrophic consequences to their physical health, mental health and appearance. Others see insomnia as a complete loss of control.  These perceived consequences of insomnia often prompt people to seek medication or other treatment.  Keep in mind insomnia can be very unpleasant but for the most part is not dangerous.    ? Don't Focus on Sleep     Some people reduce their activity level because of poor sleep.  Although sleep is a necessary part of life, don't make it the focus of your thoughts and concern. Trust that if you engage in health sleep habits, your body will give you the sleep it needs.  Make sure you continue your normal activities despite your insomnia.    ? Never Try to Sleep      Of all the habits the most important one is this:  Never try to force yourself to sleep.  Doing so usually backfires and makes things worse. Instead, focus on keeping to your prescribed sleep schedule, getting up at the same  time every day and using the bed only for sleep.            Cognitive Behavioral Therapy for Insomnia (CBT-I)    Relaxation and Sleep    Winding Down Time    A wind-down time before you go to bed can help you relax your mind and body. Ways to help transition from a busy day to bedtime include things like:    ? Listening to calm music  ? Reading  ? Watching a pleasant or relaxing TV show  ? Taking a bath    We recommend you set a reminder to begin your wind down time one hour before bedtime.  Many mobile sleep apps such as CBT-I  have wind-down time reminders.    Relaxation and Sleep    Research shows relaxation training can reduce the time it takes to fall asleep and improve sleep quality.  Relaxation training works by reducing physical, emotional and mental arousal that can interfere with your sleep.     Relaxation skills are easy to learn on your own and are widely available free through mobile apps or online.  We recommend doing some form of relaxation exercise daily for at least 10 minutes.  This can include short breathing exercises used throughout the day to help manage stress.    If you are using the CBT-I  mobile lyubov you will find the following relaxation exercises in the Tools section including Breathing Tools, Progressive Muscle Relaxation and Mindfulness Exercise.    Insight Timer and Calm are two examples of many well-reviewed mobile apps that offer free and for-purchase guided relaxation exercises and meditations.     You Tube is a great resource learning a range of techniques from progressive muscle relaxation to guided imagery.  One example is Five of the Best Sleep Guided Meditations, a set of guided sleep meditations at https://www.[employer].com/watch?v=wsB_TnQfCxA    Do not use guided relaxation tapes while driving or operating equipment.      Managing Worry before Bed    All human beings worry.  Uncontrolled worry can affect your sleep and health by activating your arousal system.  Worry  makes your brain, body and heart behave like there is a danger or threat.  This stress response can make it more difficult to fall asleep and stay asleep. The most common types of worry include:    ? Concerned about unfinished tasks  ? Concern over family, finances or work  ? Worry about things beyond your control.     Scheduling Constructive Worry Time    The part of our brain that plans, sorts, and helps manage our emotions is less effective in doing its job as nighttime comes.  Without the usual distractions of the day, we tend to worry more and have trouble putting aside our worries.    A simple technique called Constructive Worry Time can help to reduce and manage worry as you approach bedtime or in the middle of the night. It is most effective when practiced daily.        STRESS AND SLEEP    Its no surprise that stress can affect sleep. It is also true that if you sleep better you can also handle stress better.  If left untreated, sleep disorders are also associated with increased risk of onset of depression and anxiety and other health risks.    During this disruptive and uncertain time in our lives due the coronavirus (COVID-19), it is especially important to tend to your emotional and physical wellbeing.   Here are a few tips and resources to consider:    1. Stay active    It s pretty well known that exercise is really good for both our physical and mental health. There s heaps of different types of exercise you can do from home, thanks to YouTube and apps.  Do what fits your needs and health circumstances.    2. Practice good sleep habits      Keep a consistent sleep schedule and allow 7-8 hours for sleep.    Only use your bed for sleep and sex.    Wind down before bed.    Avoid using electronics and mobile devices an hour before bedtime or in bed.    Avoid caffeine use within six hours of your bedtime.     Avoid alcohol use within three hours of your bedtime.    2. Take 5 to chill.    When we re stressed  "about something (such as coronavirus), our thoughts, heart-rate and breathing tend to speed up. Taking even 5 minutes or so to practice mindfulness can help produce a sense of calmness. What the heck is \"mindfulness\" you ask?  Basically it is any activity, be it meditation, sitting in a relaxed position and taking in the experience and sensations of the present moment, or enjoying relaxed breathing.    If you are experiencing insomnia, the free CBT-I  mobile lyubov has great relaxation tools in their Tools section including:      Breathing Tool    Progressive Muscle Relaxation    Body Scan    Mindfulness Exercise    Guided Imagery ([place], [place])    Insight Timer and Calm apps have thousands of free guided meditations and exercise on a variety of topics that may be of interest such as managing stress, sleep, and meditation.    3. Chat with your friends and family.    Even if an in-person meet-up is off the table, try to stay in touch via text, Skype, Messenger, SeeMore Interactivepp, OpenZine, or phone call. Ask them how they re feeling and share your own experience if you feel safe to do so.    5. Take a break from the news    Between the news and social media, we are all feeling saturated by news updates and \"breaking news.\"  It s important to stay informed, but try to limit your media intake to a couple of times a day and use trusted news sources. If you catch yourself turning to social media because you re feeling isolated, take a break and spend time on another activity.    6. Listen to Music    Music can make us feel so much better. If you don't have a music playlist on your phone, consider making one.     7. Watch or read something uplifting    Distraction can be a good thing. Watch something that you find uplifting and allow yourself to zone out from what s going on in the world. 800APPube is a great option too.     8. Learn something new    Have you wanted to get into drawing or learning a musical " instrument? Now s a great time to make a start. TheVeg[employer].comube has great free online tutorials for pretty much everything.    Helpful Advice from the Sleep Foundation    Copy/paste or type into your browser:    www.sleepfoundation.org/sleep-guidelines-covid-19-isolation      Stress Management and Relaxation Books      The Relaxation and Stress Reduction Workbook by Cheri Wong, Linda Cosme and Raudel Farmer (2008)    Stress Management Workbook: Techniques and Self-Assessment Procedures by Mari Kam and Kendall Su (1997)    A Mindfulness-Based Stress Reduction Workbook by Fabrizio Waters and Selam Pride (2010)    The Complete Stress Management Workbook by Ry Ramsey, Michel Lara and Tc Jung (1996)          Your BMI is Body mass index is 32.44 kg/m .  Weight management is a personal decision.  If you are interested in exploring weight loss strategies, the following discussion covers the approaches that may be successful. Body mass index (BMI) is one way to tell whether you are at a healthy weight, overweight, or obese. It measures your weight in relation to your height.  A BMI of 18.5 to 24.9 is in the healthy range. A person with a BMI of 25 to 29.9 is considered overweight, and someone with a BMI of 30 or greater is considered obese. More than two-thirds of American adults are considered overweight or obese.  Being overweight or obese increases the risk for further weight gain. Excess weight may lead to heart disease and diabetes.  Creating and following plans for healthy eating and physical activity may help you improve your health.  Weight control is part of healthy lifestyle and includes exercise, emotional health, and healthy eating habits. Careful eating habits lifelong are the mainstay of weight control. Though there are significant health benefits from weight loss, long-term weight loss with diet alone may be very difficult to achieve- studies show long-term success with  dietary management in less than 10% of people. Attaining a healthy weight may be especially difficult to achieve in those with severe obesity. In some cases, medications, devices and surgical management might be considered.  What can you do?  If you are overweight or obese and are interested in methods for weight loss, you should discuss this with your provider.     Consider reducing daily calorie intake by 500 calories.     Keep a food journal.     Avoiding skipping meals, consider cutting portions instead.    Diet combined with exercise helps maintain muscle while optimizing fat loss. Strength training is particularly important for building and maintaining muscle mass. Exercise helps reduce stress, increase energy, and improves fitness. Increasing exercise without diet control, however, may not burn enough calories to loose weight.       Start walking three days a week 10-20 minutes at a time    Work towards walking thirty minutes five days a week     Eventually, increase the speed of your walking for 1-2 minutes at time    In addition, we recommend that you review healthy lifestyles and methods for weight loss available through the National Institutes of Health patient information sites:  http://win.niddk.nih.gov/publications/index.htm    And look into health and wellness programs that may be available through your health insurance provider, employer, local community center, or [employer].    Weight management plan: Patient was referred to their PCP to discuss a diet and exercise plan.

## 2020-07-10 ENCOUNTER — VIRTUAL VISIT (OUTPATIENT)
Dept: SLEEP MEDICINE | Facility: CLINIC | Age: 45
End: 2020-07-10
Payer: COMMERCIAL

## 2020-07-10 DIAGNOSIS — F51.04 CHRONIC INSOMNIA: Primary | ICD-10-CM

## 2020-07-10 DIAGNOSIS — G47.33 OSA (OBSTRUCTIVE SLEEP APNEA): ICD-10-CM

## 2020-07-10 PROCEDURE — 90832 PSYTX W PT 30 MINUTES: CPT | Mod: GT | Performed by: PSYCHOLOGIST

## 2020-07-10 ASSESSMENT — ANXIETY QUESTIONNAIRES: GAD7 TOTAL SCORE: 8

## 2020-11-10 ENCOUNTER — MYC MEDICAL ADVICE (OUTPATIENT)
Dept: FAMILY MEDICINE | Facility: CLINIC | Age: 45
End: 2020-11-10

## 2020-12-06 ENCOUNTER — HEALTH MAINTENANCE LETTER (OUTPATIENT)
Age: 45
End: 2020-12-06

## 2021-04-09 ENCOUNTER — IMMUNIZATION (OUTPATIENT)
Dept: NURSING | Facility: CLINIC | Age: 46
End: 2021-04-09
Payer: COMMERCIAL

## 2021-04-09 PROCEDURE — 91300 PR COVID VAC PFIZER DIL RECON 30 MCG/0.3 ML IM: CPT

## 2021-04-09 PROCEDURE — 0001A PR COVID VAC PFIZER DIL RECON 30 MCG/0.3 ML IM: CPT

## 2021-04-30 ENCOUNTER — IMMUNIZATION (OUTPATIENT)
Dept: NURSING | Facility: CLINIC | Age: 46
End: 2021-04-30
Attending: INTERNAL MEDICINE
Payer: COMMERCIAL

## 2021-04-30 PROCEDURE — 0002A PR COVID VAC PFIZER DIL RECON 30 MCG/0.3 ML IM: CPT

## 2021-04-30 PROCEDURE — 91300 PR COVID VAC PFIZER DIL RECON 30 MCG/0.3 ML IM: CPT

## 2021-07-06 ENCOUNTER — TRANSFERRED RECORDS (OUTPATIENT)
Dept: HEALTH INFORMATION MANAGEMENT | Facility: CLINIC | Age: 46
End: 2021-07-06

## 2021-07-15 ENCOUNTER — TRANSFERRED RECORDS (OUTPATIENT)
Dept: HEALTH INFORMATION MANAGEMENT | Facility: CLINIC | Age: 46
End: 2021-07-15

## 2021-08-24 ASSESSMENT — ENCOUNTER SYMPTOMS
COUGH: 0
NAUSEA: 1
FEVER: 0
PALPITATIONS: 0
DYSURIA: 0
MYALGIAS: 1
DIZZINESS: 0
SHORTNESS OF BREATH: 0
FREQUENCY: 0
JOINT SWELLING: 0
HEMATOCHEZIA: 0
WEAKNESS: 0
HEADACHES: 0
CHILLS: 0
CONSTIPATION: 0
ARTHRALGIAS: 1
HEARTBURN: 0
ABDOMINAL PAIN: 0
HEMATURIA: 0
NERVOUS/ANXIOUS: 1
SORE THROAT: 0
PARESTHESIAS: 0
EYE PAIN: 0
DIARRHEA: 0

## 2021-08-25 ENCOUNTER — OFFICE VISIT (OUTPATIENT)
Dept: FAMILY MEDICINE | Facility: CLINIC | Age: 46
End: 2021-08-25
Payer: COMMERCIAL

## 2021-08-25 VITALS
BODY MASS INDEX: 32.82 KG/M2 | HEART RATE: 85 BPM | DIASTOLIC BLOOD PRESSURE: 78 MMHG | OXYGEN SATURATION: 97 % | TEMPERATURE: 97.7 F | SYSTOLIC BLOOD PRESSURE: 124 MMHG | HEIGHT: 65 IN | WEIGHT: 197 LBS

## 2021-08-25 DIAGNOSIS — Z11.59 NEED FOR HEPATITIS C SCREENING TEST: ICD-10-CM

## 2021-08-25 DIAGNOSIS — Z00.00 ROUTINE GENERAL MEDICAL EXAMINATION AT A HEALTH CARE FACILITY: Primary | ICD-10-CM

## 2021-08-25 DIAGNOSIS — Z13.6 CARDIOVASCULAR SCREENING; LDL GOAL LESS THAN 160: ICD-10-CM

## 2021-08-25 DIAGNOSIS — G47.33 OSA (OBSTRUCTIVE SLEEP APNEA): ICD-10-CM

## 2021-08-25 DIAGNOSIS — Z12.5 SCREENING FOR PROSTATE CANCER: ICD-10-CM

## 2021-08-25 DIAGNOSIS — Z11.4 SCREENING FOR HIV (HUMAN IMMUNODEFICIENCY VIRUS): ICD-10-CM

## 2021-08-25 PROBLEM — E66.01 MORBID OBESITY (H): Status: RESOLVED | Noted: 2019-08-20 | Resolved: 2021-08-25

## 2021-08-25 PROCEDURE — G0103 PSA SCREENING: HCPCS | Performed by: FAMILY MEDICINE

## 2021-08-25 PROCEDURE — 80053 COMPREHEN METABOLIC PANEL: CPT | Performed by: FAMILY MEDICINE

## 2021-08-25 PROCEDURE — 86803 HEPATITIS C AB TEST: CPT | Performed by: FAMILY MEDICINE

## 2021-08-25 PROCEDURE — 99396 PREV VISIT EST AGE 40-64: CPT | Performed by: FAMILY MEDICINE

## 2021-08-25 PROCEDURE — 80061 LIPID PANEL: CPT | Performed by: FAMILY MEDICINE

## 2021-08-25 PROCEDURE — 87389 HIV-1 AG W/HIV-1&-2 AB AG IA: CPT | Performed by: FAMILY MEDICINE

## 2021-08-25 PROCEDURE — 36415 COLL VENOUS BLD VENIPUNCTURE: CPT | Performed by: FAMILY MEDICINE

## 2021-08-25 ASSESSMENT — MIFFLIN-ST. JEOR: SCORE: 1688.59

## 2021-08-26 LAB
HCV AB SERPL QL IA: NONREACTIVE
HIV 1+2 AB+HIV1 P24 AG SERPL QL IA: NONREACTIVE

## 2021-08-27 LAB
ALBUMIN SERPL-MCNC: 4.5 G/DL (ref 3.4–5)
ALP SERPL-CCNC: 77 U/L (ref 40–150)
ALT SERPL W P-5'-P-CCNC: 59 U/L (ref 0–70)
ANION GAP SERPL CALCULATED.3IONS-SCNC: 6 MMOL/L (ref 3–14)
AST SERPL W P-5'-P-CCNC: 31 U/L (ref 0–45)
BILIRUB SERPL-MCNC: 0.3 MG/DL (ref 0.2–1.3)
BUN SERPL-MCNC: 10 MG/DL (ref 7–30)
CALCIUM SERPL-MCNC: 8.8 MG/DL (ref 8.5–10.1)
CHLORIDE BLD-SCNC: 104 MMOL/L (ref 94–109)
CHOLEST SERPL-MCNC: 277 MG/DL
CO2 SERPL-SCNC: 27 MMOL/L (ref 20–32)
CREAT SERPL-MCNC: 0.96 MG/DL (ref 0.52–1.25)
FASTING STATUS PATIENT QL REPORTED: YES
GFR SERPL CREATININE-BSD FRML MDRD: >90 ML/MIN/1.73M2
GLUCOSE BLD-MCNC: 80 MG/DL (ref 70–99)
HDLC SERPL-MCNC: 49 MG/DL
LDLC SERPL CALC-MCNC: 192 MG/DL
NONHDLC SERPL-MCNC: 228 MG/DL
POTASSIUM BLD-SCNC: 4.5 MMOL/L (ref 3.4–5.3)
PROT SERPL-MCNC: 8.8 G/DL (ref 6.8–8.8)
PSA SERPL-MCNC: 0.74 UG/L (ref 0–4)
SODIUM SERPL-SCNC: 137 MMOL/L (ref 133–144)
TRIGL SERPL-MCNC: 182 MG/DL

## 2021-08-31 ENCOUNTER — TELEPHONE (OUTPATIENT)
Dept: FAMILY MEDICINE | Facility: CLINIC | Age: 46
End: 2021-08-31

## 2021-08-31 RX ORDER — ATORVASTATIN CALCIUM 20 MG/1
20 TABLET, FILM COATED ORAL DAILY
Qty: 90 TABLET | Refills: 1 | Status: SHIPPED | OUTPATIENT
Start: 2021-08-31 | End: 2022-03-09

## 2021-08-31 NOTE — TELEPHONE ENCOUNTER
----- Message from Kevan Kay MD sent at 8/31/2021  8:08 AM CDT -----      I have reviewed your recent labs. Here are the results:  -Your cholesterol numbers when compared to last time slight worse in terms of LDL which is bad cholesterol.  I would suggest a low-dose of cholesterol medication called Lipitor 20 mg   I would start that medication and you need to follow-up in 3 to 6 months for recheck.        -PSA (prostate specific antigen) test is normal.  This indicates a low likelihood of prostate cancer.  ADVISE: rechecking this in 1 year.  -Liver and gallbladder tests are normal (ALT,AST, Alk phos, bilirubin), kidney function is normal (Cr, GFR), sodium is normal, potassium is normal, calcium is normal, glucose is normal.  -Hepatitis C antibody screen test shows no signs of a previous hepatitis C infection.  -HIV test is normal.      Kevan Kay MD  8/31/2021

## 2021-08-31 NOTE — TELEPHONE ENCOUNTER
Non detailed message left for pt to return call to clinic and ask to speak with a triage nurse.  Advised lab results are available in my chart.    Natalie ELIZABETH RN  EP Triage

## 2021-08-31 NOTE — LETTER
September 2, 2021      [address]   [place] [id number]        Dear Pallavi,      I  have reviewed your recent labs. Here are the results:  -Your cholesterol numbers when compared to last time slight worse in terms of LDL which is bad cholesterol.  I would suggest a low-dose of cholesterol medication called Lipitor 20 mg   I would start that medication and you need to follow-up in 3 to 6 months for recheck.    -PSA (prostate specific antigen) test is normal.  This indicates a low likelihood of prostate cancer.  ADVISE: rechecking this in 1 year.  -Liver and gallbladder tests are normal (ALT,AST, Alk phos, bilirubin), kidney function is normal (Cr, GFR), sodium is normal, potassium is normal, calcium is normal, glucose is normal.  -Hepatitis C antibody screen test shows no signs of a previous hepatitis C infection.  -HIV test is normal.      Kevan Kay MD  8/31/2021    Please call us back in the clinic at [phone] if you are agreeable to starting the medication Dr. Kay has recommended.     Thanks,   Myra BAHENA

## 2021-09-02 NOTE — TELEPHONE ENCOUNTER
3rd attempt:     Left non detailed voice message for pt to call back and speak with RN. Sent pt Mychart message with Dr. Kay's lab not below.     Sent pt lab results by mail.     Closing encounter.    Myra Lamas RN

## 2021-09-25 ENCOUNTER — HEALTH MAINTENANCE LETTER (OUTPATIENT)
Age: 46
End: 2021-09-25

## 2021-10-28 ENCOUNTER — TRANSFERRED RECORDS (OUTPATIENT)
Dept: HEALTH INFORMATION MANAGEMENT | Facility: CLINIC | Age: 46
End: 2021-10-28
Payer: COMMERCIAL

## 2021-12-09 NOTE — PATIENT INSTRUCTIONS
Armida,    Your procedure/Surgery is scheduled at [facility]:  [address]   on _12/15_ at _12:30pm_, please plan to arrive at _10:30am_.    Please do NOT follow the arrival time on your live well lindy, it is incorrect and doing so may cause your procedure to be cancelled (we are working on the issue).    Free  service is available Monday through Friday starting at 8am until 3:30pm     If you have received the COVID vaccine, bring your vaccination card with you the day of your procedure.    Please bring your insurance card, 's license and a list of your medications, vitamins and supplements to the hospital, including the last dosage and time taken.    ON DAY OF PROCEDURE, CHECK IN AT:    Day Surgery - located on the 2nd floor. ([phone])      COVID Testing:    Your covid test is scheduled on: 12/13__ at 6:20pm__      At [facility] on:  [address]    [phone]      Important Information:    Hibiclens Highly Recommended    Please bathe or shower the evening before and morning of your procedure/surgery.  Avoid using lotions, creams, powders, make-up and deodorant on your clean skin.    Remove all jewelry, earrings, body piercings and contact lenses before coming to the hospital.    You may wear you glasses, hearing aids and/or dentures to the hospital. Please bring a case as they will need to be removed prior to the procedure/surgery.    If you are a smoker, please DO NOT SMOKE FOR 12 HOURS PRIOR  to your procedure/surgery.  This includes cigarettes, marijuana and vaping.    If your doctor mentioned that you may might spend the night, please bring a small overnight bag with toiletries and any personal items you may need.  If you use a CPAP machine and will be spending the night, bring your machine, tubing and mask.    Due to anesthesia, you will not be able to operate power tools, drink alcohol, or drive a vehicle for 24 hours    Preventive Health Recommendations  Male Ages 40 to 49    Yearly exam:             See your health care provider every year in order to  o   Review health changes.   o   Discuss preventive care.    o   Review your medicines if your doctor has prescribed any.    You should be tested each year for STDs (sexually transmitted diseases) if you re at risk.     Have a cholesterol test every 5 years.     Have a colonoscopy (test for colon cancer) if someone in your family has had colon cancer or polyps before age 50.     After age 45, have a diabetes test (fasting glucose). If you are at risk for diabetes, you should have this test every 3 years.      Talk with your health care provider about whether or not a prostate cancer screening test (PSA) is right for you.    Shots: Get a flu shot each year. Get a tetanus shot every 10 years.     Nutrition:    Eat at least 5 servings of fruits and vegetables daily.     Eat whole-grain bread, whole-wheat pasta and brown rice instead of white grains and rice.     Get adequate Calcium and Vitamin D.     Lifestyle    Exercise for at least 150 minutes a week (30 minutes a day, 5 days a week). This will help you control your weight and prevent disease.     Limit alcohol to one drink per day.     No smoking.     Wear sunscreen to prevent skin cancer.     See your dentist every six months for an exam and cleaning.       after surgery.  You will not be able to walk home, use public transportation or take a cab, Uber or Lyft home.      *You must have a responsible adult (18 or older) to drive you home and stay with you overnight following the procedure/surgery.*    Diet:    DO NOT eat any solid food after midnight prior to your procedure/surgery. This includes hard candy, gum and mints. Eating after midnight could cause a delay or cancellation of your procedure/surgery.      You may have clear liquids up until 4 hours before your procedure/surgery. This includes liquids that do not contain pulp, dairy or cloudiness. Clear liquids include beverages like water, apple juice, sprite/7Up, broth, Jello, black coffee, and tea.       Medication Instructions:    Starting now, STOP taking supplements, vitamins containing vitamin E, and fish oil.    STOP taking non-steroidal, anti-inflammatory drugs, otherwise known as NSAIDS per your surgeon's instructions.  Examples of NSAIDS are Aleve, ibuprofen, Motrin, Advil and Naproxen.        Follow your doctor's instructions regarding: aspirin __ .      Take only the following medications before coming to the hospital. If they are pills, you may take them with a small sip of water: _trulance,levothyroxine, and omeprazole_    You may also take the following medications if needed: imitrex__.      If there are any changes in your physical condition, such as cold, fever or infection, or you have specific questions regarding your procedure/surgery, please contact your surgeon directly.    For questions regarding these instructions, contact Pre-Admission Testing at [phone], Monday through Friday, 8 am - 4 pm.    On the day of your procedure, please bring in a copy of your complete up-to-date medication list, I.D., insurance card and COVID vaccine card (if you have been vaccinated).        REQUIRED INSTRUCTIONS PRESURGERY SHOWER/BATH   DO NOT: shave any part of the surgical area for at least 2 days prior  to surgery.   Tiny skin cuts and abrasions from shaving in the surgical area can increase risk of infection.   DO NOT use lotions on skin before surgery.   DO use a fresh clean towel after each shower.   DO dress in clean clothes after each of your showers.   DO shower or bathe your whole body, including hair, on the night before surgery.   DO shower again on the morning of surgery if time permits (do not wash hair during this shower)     GENERAL REQUIRED SHOWER/BATH INSTRUCTIONS (SOAP AND WATER)   1. Use warm, but not hot, water for shower/bath       NOTE: Showers are recommended. Showers are more effective than a bath for pre-operative skin cleaning   2. Use regular soap to remove dirt and germs from your skin   3. Do not scrub so vigorously that skin is abraded or scratched   4. Use clean towels to gently dry your skin after each shower/bath          Instructions for Preoperative Skin Cleansing Before Planned Surgical Procedure at [facility]   For your safety and to help prevent infection, please follow this:    HIGHLY RECOMMENDED additional antiseptic shower/bath.   ANTISEPTIC SHOWER/BATH PROCESS     e.g. HIBICLENS* or alternative chlorhexidine (CHG) containing skin antisepsis solutions   Chlorhexidine gluconate (2% - 4%) skin cleansing solutions are wash solutions used to kill germs on the skin. They are available over the counter (no prescription needed) at minimal cost at most pharmacies and drug stores.   *WHEN USING HIBICLENS* MAY CAUSE SHOWER AND BATHTUB SURFACES TO BECOME SLIPPERY*   Instructions for \"whole body\" shower or bath with chlorhexidine gluconate (CHG) antiseptic solution   1. After your required shower or bath, rinse thoroughly.   2. Step away from the stream of water, and thoroughly apply enough solution to cover skin below the neck (not head or face), within skin folds, and in hard to reach areas (e.g. the back of the knees, inside elbows, etc). Use your hands to apply and rub onto your  skin without using a washcloth. Note: Solution will NOT suds up when applying   3. Rinse thoroughly.   4. Dry off with a clean towel.   5. Dress in clean clothes.   • DO NOT use on the head or face DO NOT use in the genital area   • DO NOT use on non-healing wounds DO NOT use in eyes, ears or mouth.   Making sure that your skin is clean and ready for surgery at home is very important.   You can reduce the risk of infection by following the required instructions above.     email

## 2022-01-28 ENCOUNTER — TELEPHONE (OUTPATIENT)
Dept: FAMILY MEDICINE | Facility: CLINIC | Age: 47
End: 2022-01-28
Payer: COMMERCIAL

## 2022-01-28 NOTE — TELEPHONE ENCOUNTER
Pt calling to request meloxicam. Previously prescribed by [facility] and not on current med list.  Morena Zavala,

## 2022-01-31 NOTE — TELEPHONE ENCOUNTER
Patient states that he takes meloxicam for lumbar arthritis. Patient states that he had MRI.  States the diagnosis is facet arthropathy.     Patient was told per [facility] for further refills to contact his PCP. Patient states that pain level without meloxicam is 8/10.    Please advise if appointment needed.   Pam Lewis RN

## 2022-02-01 NOTE — TELEPHONE ENCOUNTER
I have not prescribed this medication I would suggest patient to have a follow-up so that we can discuss and see if that is appropriate if he is willing to come give him same day appointment this week we can see him

## 2022-02-01 NOTE — TELEPHONE ENCOUNTER
Patient Contact    Attempt # 1    Was call answered?  No.  Left message on voicemail with information to call triage back.    On call back:     - give Dr. Kay's reply and schedule appt with Dr. Kay.    Natalie ELIZABETH RN  EP Triage

## 2022-02-03 NOTE — TELEPHONE ENCOUNTER
S/w pt and gave Dr. Kay's reply below.  Pt states he was told by [facility] that his primary can now refill the medication since they have filled it 4 times.  Advised since Dr. Kay has not prescribed this medication he will need to be seen to discuss.  Pt states he is not able to come in this month and will have to schedule for March.  Offered 3/3 and pt states he will have to look at his schedule and will schedule via my chart.    Natalie ELIZABETH RN  EP Triage

## 2022-03-08 DIAGNOSIS — Z13.6 CARDIOVASCULAR SCREENING; LDL GOAL LESS THAN 160: ICD-10-CM

## 2022-03-09 RX ORDER — ATORVASTATIN CALCIUM 20 MG/1
TABLET, FILM COATED ORAL
Qty: 90 TABLET | Refills: 0 | Status: SHIPPED | OUTPATIENT
Start: 2022-03-09

## 2022-03-09 NOTE — TELEPHONE ENCOUNTER
Prescription approved per [facility] Refill Protocol.  Estefania MORALES RN  [facility]

## 2023-04-22 ENCOUNTER — HEALTH MAINTENANCE LETTER (OUTPATIENT)
Age: 48
End: 2023-04-22

## 2023-11-10 ENCOUNTER — TRANSFERRED RECORDS (OUTPATIENT)
Dept: HEALTH INFORMATION MANAGEMENT | Facility: CLINIC | Age: 48
End: 2023-11-10
Payer: COMMERCIAL

## 2024-06-29 ENCOUNTER — HEALTH MAINTENANCE LETTER (OUTPATIENT)
Age: 49
End: 2024-06-29